# Patient Record
Sex: FEMALE | Race: WHITE | URBAN - METROPOLITAN AREA
[De-identification: names, ages, dates, MRNs, and addresses within clinical notes are randomized per-mention and may not be internally consistent; named-entity substitution may affect disease eponyms.]

---

## 2020-12-02 ENCOUNTER — OFFICE VISIT (OUTPATIENT)
Dept: URBAN - METROPOLITAN AREA CLINIC 35 | Facility: CLINIC | Age: 75
End: 2020-12-02

## 2021-01-27 ENCOUNTER — OFFICE VISIT (OUTPATIENT)
Dept: URBAN - METROPOLITAN AREA CLINIC 35 | Facility: CLINIC | Age: 76
End: 2021-01-27

## 2021-02-03 ENCOUNTER — OFFICE VISIT (OUTPATIENT)
Dept: URBAN - METROPOLITAN AREA CLINIC 31 | Facility: CLINIC | Age: 76
End: 2021-02-03

## 2021-02-03 VITALS — BODY MASS INDEX: 21.9 KG/M2 | WEIGHT: 119 LBS | HEIGHT: 62 IN

## 2021-02-03 RX ORDER — NIACIN 500 MG
1 TABLET ORAL DAILY
Status: ACTIVE | COMMUNITY

## 2021-02-03 RX ORDER — OMEPRAZOLE 40 MG/1
1 CAPSULE CAPSULE, DELAYED RELEASE ORAL QAM
Qty: 30 | Refills: 5 | OUTPATIENT
Start: 2021-02-03

## 2021-02-03 RX ORDER — OMEPRAZOLE 40 MG/1
1 CAPSULE CAPSULE, DELAYED RELEASE ORAL ONCE A DAY
Qty: 90 CAPSULE | Refills: 3 | Status: ACTIVE | COMMUNITY
Start: 2019-06-26

## 2021-02-03 RX ORDER — LORAZEPAM 0.5 MG/1
1 TABLET AS NEEDED TABLET ORAL AS NEEDED
Status: DISCONTINUED | COMMUNITY

## 2021-02-03 RX ORDER — OMEPRAZOLE AND SODIUM BICARBONATE 20; 1100 MG/1; MG/1
1 CAPSULE ON AN EMPTY STOMACH CAPSULE ORAL
Status: ACTIVE | COMMUNITY
Start: 2014-12-02

## 2021-02-03 RX ORDER — DOCUSATE SODIUM 100 MG/1
1 CAPSULE ORAL ONCE A DAY
Status: ACTIVE | COMMUNITY

## 2021-02-03 RX ORDER — UBIDECARENONE/VIT E ACET 100MG-5
1 TABLET CAPSULE ORAL ONCE A DAY
Status: ACTIVE | COMMUNITY

## 2021-02-03 RX ORDER — GLUCOSAMINE/CHONDR SU A SOD 750-600 MG
1 CAPSULE TABLET ORAL
Status: ACTIVE | COMMUNITY

## 2021-02-03 NOTE — HPI-MIGRATED HPI
;     Cough : Patient presents today for a follow-up office visit from 12/11/2019. She is currently taking Omeprazole 40 mg, daily. Patient uses both Zegerid and TUMS for breakthrough episodes of heartburn. She states that she continues to have coughing episodes, several times a day. Patient admits to breakthrough heartburn episodes and these are occurring about twice a month. She will either take a Zegrrid or TUMS for heartburn relief and they do help. Patient admits to "feeling like something is stuck in her throat". This globus type sensation comes and goes and patient states when it happens, it just takes time to go away. Patient denies any dysphagia episodes. She admits to recently having some regurgitation. Patient admits to feeling like being on the verge of feeling nauseous, but not having a full-blown episode of nausea. She denies vomiting. Of note, patient requests refills of Omeprazole 40 mg, daily, in a 90 day supply, sent to confirmed pharmacy in EMR.  ;

## 2021-02-17 ENCOUNTER — OFFICE VISIT (OUTPATIENT)
Dept: URBAN - METROPOLITAN AREA CLINIC 31 | Facility: CLINIC | Age: 76
End: 2021-02-17

## 2021-06-09 ENCOUNTER — OFFICE VISIT (OUTPATIENT)
Dept: URBAN - METROPOLITAN AREA CLINIC 31 | Facility: CLINIC | Age: 76
End: 2021-06-09

## 2021-06-09 VITALS
OXYGEN SATURATION: 95 % | WEIGHT: 120 LBS | SYSTOLIC BLOOD PRESSURE: 112 MMHG | DIASTOLIC BLOOD PRESSURE: 68 MMHG | HEART RATE: 77 BPM | HEIGHT: 62 IN | BODY MASS INDEX: 22.08 KG/M2

## 2021-06-09 RX ORDER — GLUCOSAMINE/CHONDR SU A SOD 750-600 MG
1 CAPSULE TABLET ORAL
Status: ACTIVE | COMMUNITY

## 2021-06-09 RX ORDER — DOCUSATE SODIUM 100 MG/1
1 CAPSULE ORAL ONCE A DAY
Status: ACTIVE | COMMUNITY

## 2021-06-09 RX ORDER — OMEPRAZOLE 40 MG/1
1 CAPSULE CAPSULE, DELAYED RELEASE ORAL QAM
Qty: 30 | Refills: 5 | Status: ACTIVE | COMMUNITY
Start: 2021-02-03

## 2021-06-09 RX ORDER — CARBOXYMETHYLCELLULOSE SODIUM 10 MG/ML
3 CAPSULES GEL OPHTHALMIC ONCE A DAY
Qty: 90 | Status: ACTIVE | COMMUNITY

## 2021-06-09 RX ORDER — NIACIN 500 MG
1 TABLET ORAL DAILY
Status: ACTIVE | COMMUNITY

## 2021-06-09 RX ORDER — UBIDECARENONE/VIT E ACET 100MG-5
1 TABLET CAPSULE ORAL ONCE A DAY
Status: ACTIVE | COMMUNITY

## 2021-06-09 RX ORDER — OMEPRAZOLE AND SODIUM BICARBONATE 20; 1100 MG/1; MG/1
1 CAPSULE ON AN EMPTY STOMACH CAPSULE ORAL
Status: ACTIVE | COMMUNITY
Start: 2014-12-02

## 2021-06-09 RX ORDER — OMEPRAZOLE 40 MG/1
1 CAPSULE CAPSULE, DELAYED RELEASE ORAL
Qty: 60 CAPSULES | Refills: 5
Start: 2021-02-03

## 2021-06-09 RX ORDER — OMEPRAZOLE 40 MG/1
1 CAPSULE CAPSULE, DELAYED RELEASE ORAL ONCE A DAY
Qty: 90 CAPSULE | Refills: 3 | Status: DISCONTINUED | COMMUNITY
Start: 2019-06-26

## 2021-06-09 NOTE — EXAM-MIGRATED EXAMINATIONS
GENERAL APPEARANCE: - alert, in no acute distress, well developed, well nourished;   EYES: - sclera anicteric bilaterally;   ORAL CAVITY: - mucosa moist;   THROAT: - clear;   NECK/THYROID: - neck supple, full range of motion, no cervical lymphadenopathy, no thyroid nodules, no thyromegaly, trachea midline;   HEART: - no murmurs, regular rate and rhythm, S1, S2 normal;   LUNGS: - clear to auscultation bilaterally, good air movement, no wheezes, rales, rhonchi;   ABDOMEN: - bowel sounds present, no masses palpable, no organomegaly , no rebound tenderness, soft, nontender, nondistended;   EXTREMITIES: - no clubbing, cyanosis, or edema;   NEUROLOGIC: - cranial nerves 2-12 grossly intact , alert and oriented , nonfocal;   PSYCH: - cooperative with exam, mood/affect full range;

## 2021-06-09 NOTE — HPI-MIGRATED HPI
;     Cough : Patient presents today for a follow-up office visit from 02/09/2021. She is currently taking Omeprazole 40 mg, daily. Patient uses both Zegerid and TUMS for breakthrough episodes of heartburn. She states the coughing episodes have worsened. She was having breakthrough episodes of heartburn at her last office visit. Patient states that the heartburn continues, with no "rhyme or reason". Some weeks she will heartburn every single day and some weeks she will go 3 days with no heartburn. The globus sensation is still present. Patient states that she has not had any regurgitation since her last office visit. The nausea she was experiencing at her last office visit has improved. Patient denies any vomiting. Patient continues to deny any dysphagia episodes. "Every now and then she thinks a pill may get stuck". Some things going wrong way with swallowing is noted.  Of note, patient states that over the last several weeks she has had some bright red rectal bleeding, intermittently. The blood is really just on the tissue, after wiping. She has not had enough blood to stain the toilet water. Patient believes she has hemorrhoids and believes they are bleeding. Patient would like to discuss getting a prescription sent in to help with the bleeding hemorrhoids.  She denies any hard stools.  She was using Colace up to 1 week ago.;

## 2021-09-01 ENCOUNTER — OFFICE VISIT (OUTPATIENT)
Dept: URBAN - METROPOLITAN AREA CLINIC 35 | Facility: CLINIC | Age: 76
End: 2021-09-01

## 2021-09-01 ENCOUNTER — LAB OUTSIDE AN ENCOUNTER (OUTPATIENT)
Dept: URBAN - METROPOLITAN AREA SURGERY CENTER 8 | Facility: SURGERY CENTER | Age: 76
End: 2021-09-01

## 2021-09-01 VITALS
DIASTOLIC BLOOD PRESSURE: 68 MMHG | HEIGHT: 62 IN | OXYGEN SATURATION: 98 % | WEIGHT: 120 LBS | HEART RATE: 84 BPM | SYSTOLIC BLOOD PRESSURE: 114 MMHG | BODY MASS INDEX: 22.08 KG/M2

## 2021-09-01 PROBLEM — 266435005 GASTRO-ESOPHAGEAL REFLUX DISEASE WITHOUT ESOPHAGITIS: Status: ACTIVE | Noted: 2021-06-09

## 2021-09-01 RX ORDER — NIACIN 500 MG
1 TABLET ORAL DAILY
Status: ACTIVE | COMMUNITY

## 2021-09-01 RX ORDER — CARBOXYMETHYLCELLULOSE SODIUM 10 MG/ML
3 CAPSULES GEL OPHTHALMIC ONCE A DAY
Qty: 90 | Status: ACTIVE | COMMUNITY

## 2021-09-01 RX ORDER — DOCUSATE SODIUM 100 MG/1
1 CAPSULE ORAL ONCE A DAY
Status: ACTIVE | COMMUNITY

## 2021-09-01 RX ORDER — SODIUM PICOSULFATE, MAGNESIUM OXIDE, AND ANHYDROUS CITRIC ACID 10; 3.5; 12 MG/160ML; G/160ML; G/160ML
160 ML LIQUID ORAL
Qty: 1 KIT | Refills: 0 | OUTPATIENT
Start: 2021-09-01

## 2021-09-01 RX ORDER — OMEPRAZOLE 40 MG/1
1 CAPSULE CAPSULE, DELAYED RELEASE ORAL
Qty: 60 CAPSULES | Refills: 5 | Status: ACTIVE | COMMUNITY
Start: 2021-02-03

## 2021-09-01 RX ORDER — OMEPRAZOLE AND SODIUM BICARBONATE 20; 1100 MG/1; MG/1
1 CAPSULE ON AN EMPTY STOMACH CAPSULE ORAL
Status: ACTIVE | COMMUNITY
Start: 2014-12-02

## 2021-09-01 RX ORDER — FAMOTIDINE 40 MG/1
1 TABLET AT BEDTIME TABLET, FILM COATED ORAL ONCE A DAY
Qty: 30 TABLET | Refills: 5 | OUTPATIENT
Start: 2021-09-01

## 2021-09-01 RX ORDER — UBIDECARENONE/VIT E ACET 100MG-5
1 TABLET CAPSULE ORAL ONCE A DAY
Status: ACTIVE | COMMUNITY

## 2021-09-01 NOTE — EXAM-MIGRATED EXAMINATIONS
GENERAL APPEARANCE: - alert, in no acute distress, well developed, well nourished;   EYES: - sclera anicteric bilaterally;   ORAL CAVITY: - mucosa moist;   THROAT: - clear;   NECK/THYROID: - neck supple, full range of motion, no cervical lymphadenopathy, no thyroid nodules, no thyromegaly, trachea midline;   SKIN: - no suspicious lesions, warm and dry, no spider angiomata, palmar erythema or icterus;   HEART: - no murmurs, regular rate and rhythm, S1, S2 normal;   LUNGS: - clear to auscultation bilaterally, good air movement, no wheezes, rales, rhonchi;   ABDOMEN: - bowel sounds present, no masses palpable, no organomegaly , no rebound tenderness, soft, nontender, nondistended;   EXTREMITIES: - no clubbing, cyanosis, or edema;

## 2021-09-01 NOTE — HPI-MIGRATED HPI
;     Cough : Patient presents today for a follow-up office visit from 06/09/2021. The barium swallow study was completed on 08/18/2021 and the GES was also completed on 06/18/2021. Both reports have been scanned into EMR. She is currently taking Omeprazole 40 mg, BID. Patient uses both Zegerid and TUMS for breakthrough episodes of heartburn. She states the coughing episodes have worsened. Patient states that she needs to do something to help with the coughing as it is much worse. Patient states that the heartburn has been "not good". Patient states the heartburn is sporadic and happens with no rhyme or reason. The globus sensation is still present. Patient states that she has not had any regurgitation since her last office visit. The nausea continues to improve. Patient denies any vomiting. Patient admits  dysphagia episodes with pills. This was not happening at her last office visit. Patient admits re-starting Colace, daily. Since her last office visit, the rectal bleeding has not stopped. In fact, whereas, last office visit, the blood was just on the tissue after wiping and she has now had one episode of rectal bleeding that stained the entire toilet bowel. She is currently having 1-2 bowel movements per day. Patient states that her stools are normal to loose. She denies mucous in her stool and states that melena is not present.  ;

## 2021-09-02 ENCOUNTER — WEB ENCOUNTER (OUTPATIENT)
Dept: URBAN - METROPOLITAN AREA CLINIC 35 | Facility: CLINIC | Age: 76
End: 2021-09-02

## 2021-10-04 ENCOUNTER — OFFICE VISIT (OUTPATIENT)
Dept: URBAN - METROPOLITAN AREA SURGERY CENTER 8 | Facility: SURGERY CENTER | Age: 76
End: 2021-10-04

## 2021-11-03 ENCOUNTER — OFFICE VISIT (OUTPATIENT)
Dept: URBAN - METROPOLITAN AREA CLINIC 35 | Facility: CLINIC | Age: 76
End: 2021-11-03

## 2021-11-18 ENCOUNTER — OFFICE VISIT (OUTPATIENT)
Dept: URBAN - METROPOLITAN AREA SURGERY CENTER 8 | Facility: SURGERY CENTER | Age: 76
End: 2021-11-18

## 2021-12-01 ENCOUNTER — OFFICE VISIT (OUTPATIENT)
Dept: URBAN - METROPOLITAN AREA CLINIC 35 | Facility: CLINIC | Age: 76
End: 2021-12-01

## 2021-12-01 VITALS
SYSTOLIC BLOOD PRESSURE: 116 MMHG | HEIGHT: 62 IN | WEIGHT: 116 LBS | OXYGEN SATURATION: 96 % | DIASTOLIC BLOOD PRESSURE: 70 MMHG | HEART RATE: 75 BPM | BODY MASS INDEX: 21.35 KG/M2

## 2021-12-01 PROBLEM — 49727002 COUGH: Status: ACTIVE | Noted: 2017-10-11

## 2021-12-01 RX ORDER — UBIDECARENONE/VIT E ACET 100MG-5
1 TABLET CAPSULE ORAL ONCE A DAY
Status: ACTIVE | COMMUNITY

## 2021-12-01 RX ORDER — OMEPRAZOLE 40 MG/1
1 CAPSULE CAPSULE, DELAYED RELEASE ORAL
Qty: 60 CAPSULES | Refills: 5 | Status: ACTIVE | COMMUNITY
Start: 2021-02-03

## 2021-12-01 RX ORDER — DOCUSATE SODIUM 100 MG/1
1 CAPSULE ORAL ONCE A DAY
Status: ACTIVE | COMMUNITY

## 2021-12-01 RX ORDER — OMEPRAZOLE AND SODIUM BICARBONATE 20; 1100 MG/1; MG/1
1 CAPSULE ON AN EMPTY STOMACH CAPSULE ORAL
Status: ACTIVE | COMMUNITY
Start: 2014-12-02

## 2021-12-01 RX ORDER — CARBOXYMETHYLCELLULOSE SODIUM 10 MG/ML
3 CAPSULES GEL OPHTHALMIC ONCE A DAY
Qty: 90 | Status: ACTIVE | COMMUNITY

## 2021-12-01 RX ORDER — SODIUM PICOSULFATE, MAGNESIUM OXIDE, AND ANHYDROUS CITRIC ACID 10; 3.5; 12 MG/160ML; G/160ML; G/160ML
160 ML LIQUID ORAL
Qty: 1 KIT | Refills: 0 | Status: DISCONTINUED | COMMUNITY
Start: 2021-09-01

## 2021-12-01 RX ORDER — NIACIN 500 MG
1 TABLET ORAL DAILY
Status: ACTIVE | COMMUNITY

## 2021-12-01 RX ORDER — FAMOTIDINE 40 MG/1
1 TABLET AT BEDTIME TABLET, FILM COATED ORAL ONCE A DAY
Qty: 30 TABLET | Refills: 5 | Status: ACTIVE | COMMUNITY
Start: 2021-09-01

## 2021-12-01 NOTE — HPI-MIGRATED HPI
;     Colonoscopy Follow-Up : Patient presents today for a follow-up after the colonoscopy that was done on 11/18/2021. Since the procedure patient states that she is having 1-2 bowel movements per day with normal and stools.  Patient denies seeing any blood or mucous in her stool. Patient denies melena. Patient states that she had no complications following her colonoscopy. Of note, patient is taking Omeprazole 40 mg, BID, Famotidine 40 mg, at bedtime, and Zegerid and TUMS as needed. She is still having heartburn episodes about 1-2 times a week. She also continues to have the coughing off and on.  She uses the Robitussin (containing only Dextromethorphan without Guafenesin) only when she is at work. If she coughs at work, this creates issues with other people being around and they get concerned. Patient denies any dysphagia episodes, nausea or vomiting. Patient has not had any rectal bleeding since her colonoscopy.  Reflux only intermitently present.;

## 2022-01-07 ENCOUNTER — TELEPHONE ENCOUNTER (OUTPATIENT)
Dept: URBAN - METROPOLITAN AREA CLINIC 36 | Facility: CLINIC | Age: 77
End: 2022-01-07

## 2022-01-07 RX ORDER — OMEPRAZOLE 40 MG/1
1 CAPSULE CAPSULE, DELAYED RELEASE ORAL
Qty: 180 | Refills: 2
Start: 2021-02-03

## 2022-01-18 ENCOUNTER — TELEPHONE ENCOUNTER (OUTPATIENT)
Dept: URBAN - METROPOLITAN AREA CLINIC 36 | Facility: CLINIC | Age: 77
End: 2022-01-18

## 2022-01-18 RX ORDER — OMEPRAZOLE 40 MG/1
1 CAPSULE CAPSULE, DELAYED RELEASE ORAL
Qty: 180 | Refills: 3
Start: 2021-02-03

## 2022-03-17 ENCOUNTER — TELEPHONE ENCOUNTER (OUTPATIENT)
Dept: URBAN - METROPOLITAN AREA CLINIC 35 | Facility: CLINIC | Age: 77
End: 2022-03-17

## 2022-04-15 ENCOUNTER — TELEPHONE ENCOUNTER (OUTPATIENT)
Dept: URBAN - METROPOLITAN AREA CLINIC 36 | Facility: CLINIC | Age: 77
End: 2022-04-15

## 2022-04-15 RX ORDER — FAMOTIDINE 40 MG/1
1 TABLET AT BEDTIME TABLET, FILM COATED ORAL ONCE A DAY
Qty: 90 | Refills: 2
Start: 2021-09-01

## 2022-04-27 ENCOUNTER — OFFICE VISIT (OUTPATIENT)
Dept: URBAN - METROPOLITAN AREA CLINIC 35 | Facility: CLINIC | Age: 77
End: 2022-04-27

## 2022-05-04 ENCOUNTER — OFFICE VISIT (OUTPATIENT)
Dept: URBAN - METROPOLITAN AREA CLINIC 35 | Facility: CLINIC | Age: 77
End: 2022-05-04
Payer: MEDICARE

## 2022-05-04 VITALS
BODY MASS INDEX: 22.08 KG/M2 | HEIGHT: 62 IN | DIASTOLIC BLOOD PRESSURE: 82 MMHG | SYSTOLIC BLOOD PRESSURE: 126 MMHG | WEIGHT: 120 LBS

## 2022-05-04 DIAGNOSIS — R68.81 EARLY SATIETY: ICD-10-CM

## 2022-05-04 DIAGNOSIS — K64.2 THIRD DEGREE HEMORRHOIDS: ICD-10-CM

## 2022-05-04 DIAGNOSIS — Z83.49 FAMILY HISTORY OF ALPHA-1-ANTITRYPSIN DEFICIENCY: ICD-10-CM

## 2022-05-04 DIAGNOSIS — R14.3 FLATULENCE: ICD-10-CM

## 2022-05-04 DIAGNOSIS — K62.5 RECTAL BLEEDING: ICD-10-CM

## 2022-05-04 DIAGNOSIS — K29.70 GASTRITIS, UNSPECIFIED, WITHOUT BLEEDING: ICD-10-CM

## 2022-05-04 DIAGNOSIS — K20.0 EOSINOPHILIC ESOPHAGITIS: ICD-10-CM

## 2022-05-04 DIAGNOSIS — R10.31 RIGHT LOWER QUADRANT PAIN: ICD-10-CM

## 2022-05-04 DIAGNOSIS — R05.9 COUGH: ICD-10-CM

## 2022-05-04 DIAGNOSIS — R05.8 DRY COUGH: ICD-10-CM

## 2022-05-04 DIAGNOSIS — Z86.010 PERSONAL HISTORY OF COLONIC POLYPS: ICD-10-CM

## 2022-05-04 DIAGNOSIS — R13.12 DYSPHAGIA, OROPHARYNGEAL: ICD-10-CM

## 2022-05-04 PROCEDURE — 99213 OFFICE O/P EST LOW 20 MIN: CPT | Performed by: INTERNAL MEDICINE

## 2022-05-04 RX ORDER — CARBOXYMETHYLCELLULOSE SODIUM 10 MG/ML
3 CAPSULES GEL OPHTHALMIC ONCE A DAY
Qty: 90 | Status: ACTIVE | COMMUNITY

## 2022-05-04 RX ORDER — OMEPRAZOLE AND SODIUM BICARBONATE 20; 1100 MG/1; MG/1
1 CAPSULE ON AN EMPTY STOMACH CAPSULE ORAL
Status: ACTIVE | COMMUNITY
Start: 2014-12-02

## 2022-05-04 RX ORDER — NIACIN 500 MG
1 TABLET ORAL DAILY
Status: ACTIVE | COMMUNITY

## 2022-05-04 RX ORDER — OMEPRAZOLE 40 MG/1
1 CAPSULE CAPSULE, DELAYED RELEASE ORAL
Qty: 180 | Refills: 3 | Status: ACTIVE | COMMUNITY
Start: 2021-02-03

## 2022-05-04 RX ORDER — UBIDECARENONE/VIT E ACET 100MG-5
1 TABLET CAPSULE ORAL ONCE A DAY
Status: ACTIVE | COMMUNITY

## 2022-05-04 RX ORDER — DOCUSATE SODIUM 100 MG/1
1 CAPSULE ORAL ONCE A DAY
Status: ACTIVE | COMMUNITY

## 2022-05-04 RX ORDER — FAMOTIDINE 40 MG/1
1 TABLET AT BEDTIME TABLET, FILM COATED ORAL ONCE A DAY
Qty: 90 | Refills: 2 | Status: ACTIVE | COMMUNITY
Start: 2021-09-01

## 2022-05-05 ENCOUNTER — OFFICE VISIT (OUTPATIENT)
Dept: URBAN - METROPOLITAN AREA CLINIC 35 | Facility: CLINIC | Age: 77
End: 2022-05-05

## 2022-06-06 LAB
AAT, DNA ANALYSIS: (no result)
ADDITIONAL INFORMATION:: (no result)
Lab: (no result)

## 2022-09-07 ENCOUNTER — OFFICE VISIT (OUTPATIENT)
Dept: URBAN - METROPOLITAN AREA CLINIC 35 | Facility: CLINIC | Age: 77
End: 2022-09-07
Payer: MEDICARE

## 2022-09-07 VITALS — HEIGHT: 62 IN | WEIGHT: 122 LBS | BODY MASS INDEX: 22.45 KG/M2

## 2022-09-07 DIAGNOSIS — Z83.49 FAMILY HISTORY OF ALPHA-1-ANTITRYPSIN DEFICIENCY: ICD-10-CM

## 2022-09-07 DIAGNOSIS — R10.31 RIGHT LOWER QUADRANT PAIN: ICD-10-CM

## 2022-09-07 DIAGNOSIS — K20.0 EOSINOPHILIC ESOPHAGITIS: ICD-10-CM

## 2022-09-07 DIAGNOSIS — Z86.010 PERSONAL HISTORY OF COLONIC POLYPS: ICD-10-CM

## 2022-09-07 DIAGNOSIS — R13.12 DYSPHAGIA, OROPHARYNGEAL: ICD-10-CM

## 2022-09-07 DIAGNOSIS — R68.81 EARLY SATIETY: ICD-10-CM

## 2022-09-07 DIAGNOSIS — R05.9 COUGH: ICD-10-CM

## 2022-09-07 DIAGNOSIS — K62.5 RECTAL BLEEDING: ICD-10-CM

## 2022-09-07 DIAGNOSIS — K29.70 GASTRITIS, UNSPECIFIED, WITHOUT BLEEDING: ICD-10-CM

## 2022-09-07 DIAGNOSIS — R14.3 FLATULENCE: ICD-10-CM

## 2022-09-07 DIAGNOSIS — K64.2 THIRD DEGREE HEMORRHOIDS: ICD-10-CM

## 2022-09-07 PROCEDURE — 99213 OFFICE O/P EST LOW 20 MIN: CPT | Performed by: INTERNAL MEDICINE

## 2022-09-07 RX ORDER — GABAPENTIN 300 MG/1
1 CAPSULE CAPSULE ORAL ONCE A DAY
Status: ACTIVE | COMMUNITY

## 2022-09-07 RX ORDER — CARBOXYMETHYLCELLULOSE SODIUM 10 MG/ML
3 CAPSULES GEL OPHTHALMIC ONCE A DAY
Qty: 90 | Status: ACTIVE | COMMUNITY

## 2022-09-07 RX ORDER — NIACIN 500 MG
1 TABLET ORAL DAILY
Status: ACTIVE | COMMUNITY

## 2022-09-07 RX ORDER — DOCUSATE SODIUM 100 MG/1
1 CAPSULE ORAL ONCE A DAY
Status: ACTIVE | COMMUNITY

## 2022-09-07 RX ORDER — UBIDECARENONE/VIT E ACET 100MG-5
1 TABLET CAPSULE ORAL ONCE A DAY
Status: ACTIVE | COMMUNITY

## 2022-09-07 RX ORDER — OMEPRAZOLE 40 MG/1
1 CAPSULE CAPSULE, DELAYED RELEASE ORAL ONCE A DAY
Qty: 90 CAPSULE | Refills: 3 | Status: ACTIVE | COMMUNITY
Start: 2021-02-03

## 2022-09-07 RX ORDER — TRAMADOL HYDROCHLORIDE 50 MG/1
1 TABLET AS NEEDED TABLET, FILM COATED ORAL ONCE A DAY
Status: ACTIVE | COMMUNITY

## 2022-09-07 RX ORDER — OMEPRAZOLE AND SODIUM BICARBONATE 20; 1100 MG/1; MG/1
1 CAPSULE ON AN EMPTY STOMACH CAPSULE ORAL
Status: ACTIVE | COMMUNITY
Start: 2014-12-02

## 2022-09-07 RX ORDER — FAMOTIDINE 40 MG/1
1 TABLET AT BEDTIME TABLET, FILM COATED ORAL ONCE A DAY
Qty: 90 | Refills: 2 | Status: ACTIVE | COMMUNITY
Start: 2021-09-01

## 2022-09-07 NOTE — HPI-COUGH
Patient presents today for a follow-up office visit from 05/24/2022. She is taking Omeprazole 40 mg daily (she confirms she is just taking this once a day) and Famotidine 40 mg, at bedtime. Patient uses Zegerid and TUMS as needed. She admits improvement in the heartburn. Patient states that coughing episodes are much better after stopping the Niacin and Omega 3. Patient denies any dysphagia episodes, nausea or vomiting. Patient is taking Colace, 2 tablets daily. Patient states that the rectal bleeding is about the same and still "comes and goes". She denies constipation and hard stools.

## 2022-09-08 NOTE — HPI-COUGH
Airway       Patient location during procedure: OR  Staff -        Anesthesiologist:  Agapito Lino MD       CRNA: Yuko Galloway APRN CRNA       Other Anesthesia Staff: Tremayne Baker       Performed By: SRNA  Consent for Airway        Urgency: elective  Indications and Patient Condition       Indications for airway management: cristino-procedural       Induction type:intravenous       Mask difficulty assessment: 1 - vent by mask    Final Airway Details       Final airway type: supraglottic airway    Supraglottic Airway Details        Type: LMA       Brand: I-Gel       LMA size: 4    Post intubation assessment        Placement verified by: capnometry, equal breath sounds and chest rise        Number of attempts at approach: 1       Number of other approaches attempted: 0       Secured with: pink tape       Ease of procedure: easy       Dentition: Intact and Unchanged       Patient presents today for a follow-up office visit from 12/01/2021. She is taking Omeprazole 40 mg BID, Famotidine 40 mg, at bedtime. Patient uses Zegerid and TUMS as needed. She admits improvement in the heartburn. Patient states that coughing episodes are even worse. She is taking more of the Robitussin and is also taking Tussin DM at night. These medications wear off more quickly now. Patient has seen both the ENT and pulmonologist and they did not have much to offer to explain the chronic cough. The pulmonologist suggested some inhalers and the ENT suggested possible future allergy testing. Over the last few days, the cough has increased even more. Patient denies any dysphagia episodes, nausea or vomiting. Of note, patient admits to some intermittent rectal bleeding. The blood is bright red and sometimes enough to stain the toilet water. Rectal bleeding happens about once every couple of weeks.

## 2023-02-08 ENCOUNTER — TELEPHONE ENCOUNTER (OUTPATIENT)
Dept: URBAN - METROPOLITAN AREA CLINIC 31 | Facility: CLINIC | Age: 78
End: 2023-02-08

## 2023-02-08 RX ORDER — FAMOTIDINE 40 MG/1
1 TABLET AT BEDTIME TABLET, FILM COATED ORAL ONCE A DAY
Qty: 90 | Refills: 2
Start: 2021-09-01

## 2023-03-08 ENCOUNTER — OFFICE VISIT (OUTPATIENT)
Dept: URBAN - METROPOLITAN AREA CLINIC 31 | Facility: CLINIC | Age: 78
End: 2023-03-08
Payer: MEDICARE

## 2023-03-08 VITALS
BODY MASS INDEX: 22.45 KG/M2 | SYSTOLIC BLOOD PRESSURE: 140 MMHG | HEIGHT: 62 IN | OXYGEN SATURATION: 100 % | HEART RATE: 73 BPM | WEIGHT: 122 LBS | DIASTOLIC BLOOD PRESSURE: 80 MMHG

## 2023-03-08 DIAGNOSIS — R13.12 DYSPHAGIA, OROPHARYNGEAL: ICD-10-CM

## 2023-03-08 DIAGNOSIS — K62.5 RECTAL BLEEDING: ICD-10-CM

## 2023-03-08 DIAGNOSIS — K29.70 GASTRITIS, UNSPECIFIED, WITHOUT BLEEDING: ICD-10-CM

## 2023-03-08 DIAGNOSIS — K64.2 THIRD DEGREE HEMORRHOIDS: ICD-10-CM

## 2023-03-08 DIAGNOSIS — K20.0 EOSINOPHILIC ESOPHAGITIS: ICD-10-CM

## 2023-03-08 DIAGNOSIS — R12 HEARTBURN: ICD-10-CM

## 2023-03-08 DIAGNOSIS — R68.81 EARLY SATIETY: ICD-10-CM

## 2023-03-08 DIAGNOSIS — Z86.010 PERSONAL HISTORY OF COLONIC POLYPS: ICD-10-CM

## 2023-03-08 DIAGNOSIS — R14.3 FLATULENCE: ICD-10-CM

## 2023-03-08 DIAGNOSIS — R10.31 RIGHT LOWER QUADRANT PAIN: ICD-10-CM

## 2023-03-08 DIAGNOSIS — R05.9 COUGH: ICD-10-CM

## 2023-03-08 DIAGNOSIS — Z83.49 FAMILY HISTORY OF ALPHA-1-ANTITRYPSIN DEFICIENCY: ICD-10-CM

## 2023-03-08 PROBLEM — 428283002 HISTORY OF POLYP OF COLON (SITUATION): Status: ACTIVE | Noted: 2021-06-09

## 2023-03-08 PROBLEM — 196731005 GASTRODUODENITIS: Status: ACTIVE | Noted: 2018-01-11

## 2023-03-08 PROBLEM — 71457002 OROPHARYNGEAL DYSPHAGIA: Status: ACTIVE | Noted: 2021-06-09

## 2023-03-08 PROBLEM — 235599003 EOSINOPHILIC ESOPHAGITIS: Status: ACTIVE | Noted: 2018-01-11

## 2023-03-08 PROCEDURE — 99213 OFFICE O/P EST LOW 20 MIN: CPT | Performed by: INTERNAL MEDICINE

## 2023-03-08 RX ORDER — OMEPRAZOLE 40 MG/1
1 CAPSULE CAPSULE, DELAYED RELEASE ORAL ONCE A DAY
Qty: 90 CAPSULE | Refills: 3 | Status: ACTIVE | COMMUNITY
Start: 2021-02-03

## 2023-03-08 RX ORDER — TRAMADOL HYDROCHLORIDE 50 MG/1
1 TABLET AS NEEDED TABLET, FILM COATED ORAL ONCE A DAY
Status: ON HOLD | COMMUNITY

## 2023-03-08 RX ORDER — NIACIN 500 MG
1 TABLET ORAL DAILY
Status: ACTIVE | COMMUNITY

## 2023-03-08 RX ORDER — GABAPENTIN 300 MG/1
1 CAPSULE CAPSULE ORAL ONCE A DAY
Status: ON HOLD | COMMUNITY

## 2023-03-08 RX ORDER — FAMOTIDINE 20 MG/1
1 TABLET AT BEDTIME TABLET, FILM COATED ORAL ONCE A DAY
Qty: 90 | Refills: 2 | Status: ACTIVE | COMMUNITY
Start: 2021-09-01

## 2023-03-08 RX ORDER — ATORVASTATIN CALCIUM 20 MG/1
1 TABLET TABLET, FILM COATED ORAL ONCE A DAY
Refills: 0 | Status: ACTIVE | COMMUNITY

## 2023-03-08 RX ORDER — ASPIRIN 81 MG/1
1 TABLET TABLET, COATED ORAL ONCE A DAY
Status: ACTIVE | COMMUNITY

## 2023-03-08 RX ORDER — MULTIVIT-MIN/IRON/FOLIC ACID/K 18-600-40
1 CAPSULE CAPSULE ORAL ONCE A DAY
Status: ACTIVE | COMMUNITY

## 2023-03-08 RX ORDER — ZOLPIDEM TARTRATE 10 MG/1
0.5 TABLET AT BEDTIME TABLET, FILM COATED ORAL ONCE A DAY
Refills: 0 | Status: ACTIVE | COMMUNITY

## 2023-03-08 RX ORDER — CARBOXYMETHYLCELLULOSE SODIUM 10 MG/ML
1 CAPSULES GEL OPHTHALMIC ONCE A DAY
Qty: 30 CAPSULE | Status: ACTIVE | COMMUNITY

## 2023-03-08 RX ORDER — OMEPRAZOLE 40 MG/1
1 CAPSULE CAPSULE, DELAYED RELEASE ORAL ONCE A DAY
Qty: 90 CAPSULE | Refills: 3
Start: 2021-02-03

## 2023-03-08 RX ORDER — DOCUSATE SODIUM 100 MG/1
2 CAPSULES CAPSULE ORAL ONCE A DAY
Status: ACTIVE | COMMUNITY

## 2023-03-08 RX ORDER — MECLIZINE HYDROCHLORIDE 25 MG/1
1 TABLET AS NEEDED TABLET ORAL
Status: ACTIVE | COMMUNITY

## 2023-03-08 RX ORDER — OMEPRAZOLE AND SODIUM BICARBONATE 20; 1100 MG/1; MG/1
1 CAPSULE ON AN EMPTY STOMACH CAPSULE ORAL
Status: ACTIVE | COMMUNITY
Start: 2014-12-02

## 2023-03-08 RX ORDER — UBIDECARENONE/VIT E ACET 100MG-5
1 TABLET CAPSULE ORAL ONCE A DAY
Status: ACTIVE | COMMUNITY

## 2023-03-08 RX ORDER — FAMOTIDINE 20 MG/1
1 TABLET AT BEDTIME TABLET, FILM COATED ORAL ONCE A DAY
Qty: 90 | Refills: 3
Start: 2021-09-01

## 2023-03-08 NOTE — HPI-COUGH
Patient presents today for a follow-up office visit from 09/07/2022. She is taking Omeprazole 40 mg daily and Famotidine 40 mg, at bedtime. Patient denies the continued use of Zegerid since 4-5 month ago.  She admits the continued use of TUMS as needed. She admits improvement in the heartburn. She admits to continued episodes of coughing intermittently.  Cough went away and then returned after a cold.  Patient denies any dysphagia episodes, nausea or vomiting. Patient is taking Colace, 2 tablets daily. She admits intermittent rectal bleeding at bowel movement within the toilet water. No anemia noted on any blood work per patient.  Patient denies any episodes of constipation or diarrhea at this time. Patient denies needing any refills at this time.

## 2024-03-06 ENCOUNTER — OV EP (OUTPATIENT)
Dept: URBAN - METROPOLITAN AREA CLINIC 31 | Facility: CLINIC | Age: 79
End: 2024-03-06

## 2024-03-12 ENCOUNTER — OV EP (OUTPATIENT)
Dept: URBAN - METROPOLITAN AREA CLINIC 31 | Facility: CLINIC | Age: 79
End: 2024-03-12
Payer: MEDICARE

## 2024-03-12 VITALS
HEIGHT: 62 IN | OXYGEN SATURATION: 94 % | HEART RATE: 81 BPM | SYSTOLIC BLOOD PRESSURE: 160 MMHG | BODY MASS INDEX: 22.82 KG/M2 | WEIGHT: 124 LBS | DIASTOLIC BLOOD PRESSURE: 90 MMHG

## 2024-03-12 DIAGNOSIS — K21.9 GASTROESOPHAGEAL REFLUX DISEASE WITHOUT ESOPHAGITIS: ICD-10-CM

## 2024-03-12 DIAGNOSIS — K64.2 BLEEDING GRADE III HEMORRHOIDS: ICD-10-CM

## 2024-03-12 PROBLEM — 428283002: Status: ACTIVE | Noted: 2024-03-12

## 2024-03-12 PROBLEM — 266435005: Status: ACTIVE | Noted: 2024-03-12

## 2024-03-12 PROCEDURE — 99214 OFFICE O/P EST MOD 30 MIN: CPT | Performed by: STUDENT IN AN ORGANIZED HEALTH CARE EDUCATION/TRAINING PROGRAM

## 2024-03-12 RX ORDER — FAMOTIDINE 40 MG/1
1 TABLET TABLET, FILM COATED ORAL
Qty: 180 | Refills: 3 | OUTPATIENT
Start: 2024-03-12

## 2024-03-12 RX ORDER — DOCUSATE SODIUM 100 MG/1
2 CAPSULES CAPSULE ORAL ONCE A DAY
Status: ACTIVE | COMMUNITY

## 2024-03-12 RX ORDER — OMEPRAZOLE 40 MG/1
1 CAPSULE CAPSULE, DELAYED RELEASE ORAL ONCE A DAY
Qty: 90 CAPSULE | Refills: 3 | Status: ACTIVE | COMMUNITY
Start: 2021-02-03

## 2024-03-12 RX ORDER — ATORVASTATIN CALCIUM 20 MG/1
1 TABLET TABLET, FILM COATED ORAL ONCE A DAY
Refills: 0 | Status: ACTIVE | COMMUNITY

## 2024-03-12 RX ORDER — MECLIZINE HYDROCHLORIDE 25 MG/1
1 TABLET AS NEEDED TABLET ORAL
Status: ACTIVE | COMMUNITY

## 2024-03-12 RX ORDER — UBIDECARENONE/VIT E ACET 100MG-5
1 TABLET CAPSULE ORAL ONCE A DAY
Status: ON HOLD | COMMUNITY

## 2024-03-12 RX ORDER — TRAMADOL HYDROCHLORIDE 50 MG/1
1 TABLET AS NEEDED TABLET, FILM COATED ORAL ONCE A DAY
Status: ON HOLD | COMMUNITY

## 2024-03-12 RX ORDER — CARBOXYMETHYLCELLULOSE SODIUM 10 MG/ML
1 CAPSULES GEL OPHTHALMIC ONCE A DAY
Qty: 30 CAPSULE | Status: ACTIVE | COMMUNITY

## 2024-03-12 RX ORDER — FAMOTIDINE 20 MG/1
1 TABLET AT BEDTIME TABLET, FILM COATED ORAL ONCE A DAY
Qty: 90 | Refills: 3 | Status: ACTIVE | COMMUNITY
Start: 2021-09-01

## 2024-03-12 RX ORDER — OMEPRAZOLE AND SODIUM BICARBONATE 20; 1100 MG/1; MG/1
1 CAPSULE ON AN EMPTY STOMACH CAPSULE ORAL
Status: ACTIVE | COMMUNITY
Start: 2014-12-02

## 2024-03-12 RX ORDER — ASPIRIN 81 MG/1
1 TABLET TABLET, COATED ORAL ONCE A DAY
Status: ACTIVE | COMMUNITY

## 2024-03-12 RX ORDER — CALCIUM CARBONATE 500 MG/1
1 TABLET TABLET ORAL THREE TIMES A DAY
Status: ACTIVE | COMMUNITY

## 2024-03-12 RX ORDER — NIACIN 500 MG
1 TABLET ORAL DAILY
Status: ON HOLD | COMMUNITY

## 2024-03-12 RX ORDER — HYDROCORTISONE ACETATE 25 MG/1
1 SUPPOSITORY SUPPOSITORY RECTAL ONCE A DAY
Qty: 10 | Refills: 0 | OUTPATIENT
Start: 2024-03-12 | End: 2024-03-22

## 2024-03-12 RX ORDER — GABAPENTIN 300 MG/1
1 CAPSULE CAPSULE ORAL ONCE A DAY
Status: ON HOLD | COMMUNITY

## 2024-03-12 RX ORDER — MULTIVIT-MIN/IRON/FOLIC ACID/K 18-600-40
1 CAPSULE CAPSULE ORAL ONCE A DAY
Status: ACTIVE | COMMUNITY

## 2024-03-12 RX ORDER — ZOLPIDEM TARTRATE 10 MG/1
0.5 TABLET AT BEDTIME TABLET, FILM COATED ORAL ONCE A DAY
Refills: 0 | Status: ACTIVE | COMMUNITY

## 2024-03-12 NOTE — HPI-TODAY'S VISIT:
79 year old female patient presents today for a routine follow-up visit. Previously following with Dr. Good for years. Patient following up primarily today to discuss reflux history and coming off PPI. She has reflux, can go for some time without symptoms and then can recurs. Comes in periods of time. She is hestiant to stay on PPI long term, she reports recent osteopenia and hesitant w/ potential  fracture association. She is also concerned of dementia association. She is taking famotidine nightly. Currently, helps relatively well. And wants to see if can take twice day.  Intermittent choking/difficulty breathing sensation otherwise no food sticking after swallowing, symptoms chronic,  multiple prior work up including MBS w/o significant findings or aspiration.  She reports hemorrhoidal symptoms, with intermittent rectal bleeding comes and goes. Does not want hemorrhoid surgery. Reports relatively excess gas, takes beano, gasx. Has list high gas producing foods andavoid. She does endorse eats lots sweets. She has BM at least once in morning, sometimes twice. Denies subjective constipation.    Last Colonoscopy 11/18/2021 One 4 mm polyp in the descending colon, removed with a cold snare. Resected and retrieved.Internal prolapsed hemorrhoids.  Pathology report of 11/18/2021 1. Colon, Descending, Polyp - Sessile serrated polyp/adenoma  Last EGD 12/01/2017 W/Bravo - Normal esophagus - Z-line regular, 39 cm from the incisors - Non-bleeding erosive gastropathy - Normal examined duodenum - Multiple biopsies were obtained in the distal esophagus - Multiple biopsies were obtained in the gastric antrum - The Bravo capsule was deployed  Pathology report of 12/01/2017 A. Stomach, Antrum Biopsy - Antral type gastric mucosa with regenerative epithelial changes suggestive of chemical injury/healing ulcer site - Giemsa stain is negative for H. Pylori microorganisms - PAS/Alcian blue stain is negative for intestinal metaplasia B. Distal Esophagus Biopsy - Eosinophilic esophagitis - No dysplasia identified - PAS/Alcian blue stain is negative for intestinal metaplasia - PAS portion of PAS/Alcian blue stain is negative for fungal prganisms  Last visit 03/08/2023 Patient presents today for a follow-up office visit from 09/07/2022. She is taking Omeprazole 40 mg daily and Famotidine 40 mg, at bedtime. Patient denies the continued use of Zegerid since 4-5 month ago. She admits the continued use of TUMS as needed. She admits improvement in the heartburn. She admits to continued episodes of coughing intermittently. Cough went away and then returned after a cold. Patient denies any dysphagia episodes, nausea or vomiting. Patient is taking Colace, 2 tablets daily. She admits intermittent rectal bleeding at bowel movement within the toilet water. No anemia noted on any blood work per patient. Patient denies any episodes of constipation or diarrhea at this time. Patient denies needing any refills at this time.

## 2024-06-11 ENCOUNTER — TELEPHONE ENCOUNTER (OUTPATIENT)
Dept: URBAN - METROPOLITAN AREA CLINIC 35 | Facility: CLINIC | Age: 79
End: 2024-06-11

## 2024-06-11 RX ORDER — HYDROCORTISONE ACETATE 25 MG/1
1 SUPPOSITORY SUPPOSITORY RECTAL ONCE A DAY
Qty: 14 | Refills: 0
Start: 2024-03-12 | End: 2024-06-25

## 2024-07-24 ENCOUNTER — LAB OUTSIDE AN ENCOUNTER (OUTPATIENT)
Dept: URBAN - METROPOLITAN AREA CLINIC 35 | Facility: CLINIC | Age: 79
End: 2024-07-24

## 2024-07-24 ENCOUNTER — OFFICE VISIT (OUTPATIENT)
Dept: URBAN - METROPOLITAN AREA CLINIC 35 | Facility: CLINIC | Age: 79
End: 2024-07-24
Payer: MEDICARE

## 2024-07-24 ENCOUNTER — DASHBOARD ENCOUNTERS (OUTPATIENT)
Age: 79
End: 2024-07-24

## 2024-07-24 VITALS
BODY MASS INDEX: 22.63 KG/M2 | DIASTOLIC BLOOD PRESSURE: 88 MMHG | HEIGHT: 62 IN | OXYGEN SATURATION: 96 % | SYSTOLIC BLOOD PRESSURE: 154 MMHG | HEART RATE: 92 BPM | WEIGHT: 123 LBS

## 2024-07-24 DIAGNOSIS — K21.9 GASTROESOPHAGEAL REFLUX DISEASE WITHOUT ESOPHAGITIS: ICD-10-CM

## 2024-07-24 DIAGNOSIS — K62.5 RECTAL BLEEDING: ICD-10-CM

## 2024-07-24 DIAGNOSIS — Z86.010 HISTORY OF COLON POLYPS: ICD-10-CM

## 2024-07-24 DIAGNOSIS — K64.8 INTERNAL HEMORRHOIDS: ICD-10-CM

## 2024-07-24 PROCEDURE — 99214 OFFICE O/P EST MOD 30 MIN: CPT | Performed by: STUDENT IN AN ORGANIZED HEALTH CARE EDUCATION/TRAINING PROGRAM

## 2024-07-24 RX ORDER — MULTIVIT-MIN/IRON/FOLIC ACID/K 18-600-40
1 CAPSULE CAPSULE ORAL ONCE A DAY
Status: ACTIVE | COMMUNITY

## 2024-07-24 RX ORDER — CALCIUM CARBONATE 500 MG/1
1 TABLET TABLET ORAL THREE TIMES A DAY
Status: ACTIVE | COMMUNITY

## 2024-07-24 RX ORDER — TRAMADOL HYDROCHLORIDE 50 MG/1
1 TABLET AS NEEDED TABLET, FILM COATED ORAL ONCE A DAY
Status: ON HOLD | COMMUNITY

## 2024-07-24 RX ORDER — MECLIZINE HYDROCHLORIDE 25 MG/1
1 TABLET AS NEEDED TABLET ORAL
Status: ACTIVE | COMMUNITY

## 2024-07-24 RX ORDER — CARBOXYMETHYLCELLULOSE SODIUM 10 MG/ML
1 CAPSULES GEL OPHTHALMIC ONCE A DAY
Qty: 30 CAPSULE | Status: ACTIVE | COMMUNITY

## 2024-07-24 RX ORDER — UBIDECARENONE/VIT E ACET 100MG-5
1 TABLET CAPSULE ORAL ONCE A DAY
Status: ON HOLD | COMMUNITY

## 2024-07-24 RX ORDER — GABAPENTIN 300 MG/1
1 CAPSULE CAPSULE ORAL ONCE A DAY
Status: ON HOLD | COMMUNITY

## 2024-07-24 RX ORDER — FAMOTIDINE 20 MG/1
1 TABLET AT BEDTIME TABLET, FILM COATED ORAL ONCE A DAY
Qty: 90 | Refills: 3 | Status: ACTIVE | COMMUNITY
Start: 2021-09-01

## 2024-07-24 RX ORDER — ZOLPIDEM TARTRATE 10 MG/1
0.5 TABLET AT BEDTIME TABLET, FILM COATED ORAL ONCE A DAY
Refills: 0 | Status: ACTIVE | COMMUNITY

## 2024-07-24 RX ORDER — NIACIN 500 MG
1 TABLET ORAL DAILY
Status: ON HOLD | COMMUNITY

## 2024-07-24 RX ORDER — ASPIRIN 81 MG/1
1 TABLET TABLET, COATED ORAL ONCE A DAY
Status: ACTIVE | COMMUNITY

## 2024-07-24 RX ORDER — OMEPRAZOLE AND SODIUM BICARBONATE 20; 1100 MG/1; MG/1
1 CAPSULE ON AN EMPTY STOMACH CAPSULE ORAL
Status: ACTIVE | COMMUNITY
Start: 2014-12-02

## 2024-07-24 RX ORDER — DOCUSATE SODIUM 100 MG/1
2 CAPSULES CAPSULE ORAL ONCE A DAY
Status: ACTIVE | COMMUNITY

## 2024-07-24 RX ORDER — FAMOTIDINE 40 MG/1
1 TABLET TABLET, FILM COATED ORAL
Qty: 180 | Refills: 3 | Status: ACTIVE | COMMUNITY
Start: 2024-03-12

## 2024-07-24 RX ORDER — ATORVASTATIN CALCIUM 20 MG/1
1 TABLET TABLET, FILM COATED ORAL ONCE A DAY
Refills: 0 | Status: ACTIVE | COMMUNITY

## 2024-07-24 RX ORDER — OMEPRAZOLE 40 MG/1
1 CAPSULE CAPSULE, DELAYED RELEASE ORAL ONCE A DAY
Qty: 90 CAPSULE | Refills: 3 | Status: ON HOLD | COMMUNITY
Start: 2021-02-03

## 2024-07-24 NOTE — HPI-TODAY'S VISIT:
79 year old female presents today for GERD and hemrrohoids follow up. Doing well on famotidine, no significant breakthrough heartburn or coughing/choking sensation. For hemorrhoids,has tried suppositories PRN, partial relief but not complete prolonged relief symptoms. Intermittent rectal bleeding still. Tried metamucil felt made her bloat. Generally 1 BM per day, intermittent incomplete evacuation.    Of last visit 3.12.24 79 year old female patient presents today for a routine follow-up visit. Previously following with Dr. Good for years. Patient following up primarily today to discuss reflux history and coming off PPI. She has reflux, can go for some time without symptoms and then can recurs. Comes in periods of time. She is hestiant to stay on PPI long term, she reports recent osteopenia and hesitant w/ potential  fracture association. She is also concerned of dementia association. She is taking famotidine nightly. Currently, helps relatively well. And wants to see if can take twice day.  Intermittent choking/difficulty breathing sensation otherwise no food sticking after swallowing, symptoms chronic,  multiple prior work up including MBS w/o significant findings or aspiration.  She reports hemorrhoidal symptoms, with intermittent rectal bleeding comes and goes. Does not want hemorrhoid surgery. Reports relatively excess gas, takes beano, gasx. Has list high gas producing foods andavoid. She does endorse eats lots sweets. She has BM at least once in morning, sometimes twice. Denies subjective constipation.    Last Colonoscopy 11/18/2021 One 4 mm polyp in the descending colon, removed with a cold snare. Resected and retrieved.Internal prolapsed hemorrhoids.  Pathology report of 11/18/2021 1. Colon, Descending, Polyp - Sessile serrated polyp/adenoma  Last EGD 12/01/2017 W/Bravo - Normal esophagus - Z-line regular, 39 cm from the incisors - Non-bleeding erosive gastropathy - Normal examined duodenum - Multiple biopsies were obtained in the distal esophagus - Multiple biopsies were obtained in the gastric antrum - The Bravo capsule was deployed  Pathology report of 12/01/2017 A. Stomach, Antrum Biopsy - Antral type gastric mucosa with regenerative epithelial changes suggestive of chemical injury/healing ulcer site - Giemsa stain is negative for H. Pylori microorganisms - PAS/Alcian blue stain is negative for intestinal metaplasia B. Distal Esophagus Biopsy - Eosinophilic esophagitis - No dysplasia identified - PAS/Alcian blue stain is negative for intestinal metaplasia - PAS portion of PAS/Alcian blue stain is negative for fungal prganisms  Last visit 03/08/2023 Patient presents today for a follow-up office visit from 09/07/2022. She is taking Omeprazole 40 mg daily and Famotidine 40 mg, at bedtime. Patient denies the continued use of Zegerid since 4-5 month ago. She admits the continued use of TUMS as needed. She admits improvement in the heartburn. She admits to continued episodes of coughing intermittently. Cough went away and then returned after a cold. Patient denies any dysphagia episodes, nausea or vomiting. Patient is taking Colace, 2 tablets daily. She admits intermittent rectal bleeding at bowel movement within the toilet water. No anemia noted on any blood work per patient. Patient denies any episodes of constipation or diarrhea at this time. Patient denies needing any refills at this time.

## 2024-08-19 ENCOUNTER — OFFICE VISIT (OUTPATIENT)
Dept: URBAN - METROPOLITAN AREA SURGERY CENTER 8 | Facility: SURGERY CENTER | Age: 79
End: 2024-08-19

## 2024-09-11 ENCOUNTER — OFFICE VISIT (OUTPATIENT)
Dept: URBAN - METROPOLITAN AREA CLINIC 35 | Facility: CLINIC | Age: 79
End: 2024-09-11

## 2024-10-07 ENCOUNTER — WEB ENCOUNTER (OUTPATIENT)
Dept: URBAN - METROPOLITAN AREA CLINIC 35 | Facility: CLINIC | Age: 79
End: 2024-10-07

## 2024-10-07 RX ORDER — HYDROCORTISONE ACETATE 25 MG/1
1 SUPPOSITORY SUPPOSITORY RECTAL ONCE A DAY
Qty: 14 | Refills: 0
Start: 2024-03-12 | End: 2024-10-22

## 2024-10-12 ENCOUNTER — WEB ENCOUNTER (OUTPATIENT)
Dept: URBAN - METROPOLITAN AREA CLINIC 35 | Facility: CLINIC | Age: 79
End: 2024-10-12

## 2025-03-12 ENCOUNTER — OFFICE VISIT (OUTPATIENT)
Dept: URBAN - METROPOLITAN AREA CLINIC 35 | Facility: CLINIC | Age: 80
End: 2025-03-12
Payer: MEDICARE

## 2025-03-12 VITALS
WEIGHT: 121 LBS | HEIGHT: 62 IN | HEART RATE: 87 BPM | DIASTOLIC BLOOD PRESSURE: 82 MMHG | SYSTOLIC BLOOD PRESSURE: 138 MMHG | BODY MASS INDEX: 22.26 KG/M2 | OXYGEN SATURATION: 98 %

## 2025-03-12 DIAGNOSIS — R12 HEARTBURN: ICD-10-CM

## 2025-03-12 DIAGNOSIS — K64.8 INTERNAL HEMORRHOIDS: ICD-10-CM

## 2025-03-12 DIAGNOSIS — K21.9 GASTROESOPHAGEAL REFLUX DISEASE WITHOUT ESOPHAGITIS: ICD-10-CM

## 2025-03-12 DIAGNOSIS — Z86.0101 HX OF ADENOMATOUS COLONIC POLYPS: ICD-10-CM

## 2025-03-12 DIAGNOSIS — K62.5 RECTAL BLEEDING: ICD-10-CM

## 2025-03-12 PROCEDURE — 99214 OFFICE O/P EST MOD 30 MIN: CPT | Performed by: STUDENT IN AN ORGANIZED HEALTH CARE EDUCATION/TRAINING PROGRAM

## 2025-03-12 RX ORDER — GABAPENTIN 300 MG/1
1 CAPSULE CAPSULE ORAL ONCE A DAY
Status: ON HOLD | COMMUNITY

## 2025-03-12 RX ORDER — FAMOTIDINE 40 MG/1
1 TABLET TABLET, FILM COATED ORAL
Qty: 180 | Refills: 3 | Status: ACTIVE | COMMUNITY
Start: 2024-03-12

## 2025-03-12 RX ORDER — MECLIZINE HYDROCHLORIDE 25 MG/1
1 TABLET AS NEEDED TABLET ORAL
Status: ON HOLD | COMMUNITY

## 2025-03-12 RX ORDER — ZOLPIDEM TARTRATE 10 MG/1
0.5 TABLET AT BEDTIME TABLET, FILM COATED ORAL ONCE A DAY
Refills: 0 | Status: ACTIVE | COMMUNITY

## 2025-03-12 RX ORDER — FAMOTIDINE 40 MG/1
1 TABLET TABLET, FILM COATED ORAL TWICE A DAY
Qty: 180 TABLET | Refills: 3
Start: 2021-09-01

## 2025-03-12 RX ORDER — MULTIVIT-MIN/IRON/FOLIC ACID/K 18-600-40
1 CAPSULE CAPSULE ORAL ONCE A DAY
Status: ACTIVE | COMMUNITY

## 2025-03-12 RX ORDER — NIACIN 500 MG
1 TABLET ORAL DAILY
Status: ON HOLD | COMMUNITY

## 2025-03-12 RX ORDER — OMEPRAZOLE/SODIUM BICARBONATE 20MG-1.1G
1 CAPSULE ON AN EMPTY STOMACH CAPSULE ORAL
Status: ON HOLD | COMMUNITY
Start: 2014-12-02

## 2025-03-12 RX ORDER — OMEPRAZOLE 40 MG/1
1 CAPSULE CAPSULE, DELAYED RELEASE ORAL ONCE A DAY
Qty: 90 CAPSULE | Refills: 3 | Status: ON HOLD | COMMUNITY
Start: 2021-02-03

## 2025-03-12 RX ORDER — TRAMADOL HYDROCHLORIDE 50 MG/1
1 TABLET AS NEEDED TABLET, FILM COATED ORAL ONCE A DAY
Status: ON HOLD | COMMUNITY

## 2025-03-12 RX ORDER — FAMOTIDINE 20 MG/1
1 TABLET AT BEDTIME TABLET, FILM COATED ORAL ONCE A DAY
Qty: 90 | Refills: 3 | Status: ON HOLD | COMMUNITY
Start: 2021-09-01

## 2025-03-12 RX ORDER — ASPIRIN 81 MG/1
1 TABLET TABLET, COATED ORAL ONCE A DAY
Status: ACTIVE | COMMUNITY

## 2025-03-12 RX ORDER — ATORVASTATIN CALCIUM 20 MG/1
1 TABLET TABLET, FILM COATED ORAL ONCE A DAY
Refills: 0 | Status: ACTIVE | COMMUNITY

## 2025-03-12 RX ORDER — UBIDECARENONE/VIT E ACET 100MG-5
1 TABLET CAPSULE ORAL ONCE A DAY
Status: ACTIVE | COMMUNITY

## 2025-03-12 RX ORDER — CALCIUM CARBONATE 500 MG/1
1 TABLET TABLET ORAL THREE TIMES A DAY
Status: ACTIVE | COMMUNITY

## 2025-03-12 RX ORDER — CARBOXYMETHYLCELLULOSE SODIUM 10 MG/ML
1 CAPSULES GEL OPHTHALMIC ONCE A DAY
Qty: 30 CAPSULE | Status: ACTIVE | COMMUNITY

## 2025-03-12 RX ORDER — DOCUSATE SODIUM 100 MG/1
2 CAPSULES CAPSULE ORAL ONCE A DAY
Status: ACTIVE | COMMUNITY

## 2025-03-12 NOTE — HPI-TODAY'S VISIT:
80-year-old female presents for follow-up for GERD, symptomatic hemorrhoids.  She reports since last visit still has intermittent bleeding, and pruritus with hemorrhoids.  Prolapse but does ultimately spontaneously reduces.  She denies any pain.  She takes courses of hydrocortisone suppository which helps temporize symptoms.  Symptoms though eventually recur.  She prefers to hold off on colonoscopy at this time until she is due for surveillance next year.She is agreeable to proceed with clinic based banding with 1 hemorrhoid column at a time.  In terms of heartburn are well-controlled on famotidine 40 mg twice daily.  Infrequent breakthrough.  Labs Completed: 12/11/2024 CBC: WBC 8.4 wnl, HGB 14.4 wnl, MCV 93.3 wnl, Platelets 240 wnl,  CMP: Bilirubin 1.2 wnl, ALK Phos 54 wnl, AST 21 wnl, ALT 17 wnl, Creatinine 0.78 wnl.   Last Visit(07/24/2024) 79 year old female presents today for GERD and hemrrohoids follow up. Doing well on famotidine, no significant breakthrough heartburn or coughing/choking sensation. For hemorrhoids,has tried suppositories PRN, partial relief but not complete prolonged relief symptoms. Intermittent rectal bleeding still. Tried metamucil felt made her bloat. Generally 1 BM per day, intermittent incomplete evacuation.   Of last visit 3.12.24 79 year old female patient presents today for a routine follow-up visit. Previously following with Dr. Good for years. Patient following up primarily today to discuss reflux history and coming off PPI. She has reflux, can go for some time without symptoms and then can recurs. Comes in periods of time. She is hestiant to stay on PPI long term, she reports recent osteopenia and hesitant w/ potential  fracture association. She is also concerned of dementia association. She is taking famotidine nightly. Currently, helps relatively well. And wants to see if can take twice day.  Intermittent choking/difficulty breathing sensation otherwise no food sticking after swallowing, symptoms chronic,  multiple prior work up including MBS w/o significant findings or aspiration.  She reports hemorrhoidal symptoms, with intermittent rectal bleeding comes and goes. Does not want hemorrhoid surgery. Reports relatively excess gas, takes beano, gasx. Has list high gas producing foods andavoid. She does endorse eats lots sweets. She has BM at least once in morning, sometimes twice. Denies subjective constipation.    Last Colonoscopy 11/18/2021 One 4 mm polyp in the descending colon, removed with a cold snare. Resected and retrieved.Internal prolapsed hemorrhoids.  Pathology report of 11/18/2021 1. Colon, Descending, Polyp - Sessile serrated polyp/adenoma  Last EGD 12/01/2017 W/Bravo - Normal esophagus - Z-line regular, 39 cm from the incisors - Non-bleeding erosive gastropathy - Normal examined duodenum - Multiple biopsies were obtained in the distal esophagus - Multiple biopsies were obtained in the gastric antrum - The Bravo capsule was deployed  Pathology report of 12/01/2017 A. Stomach, Antrum Biopsy - Antral type gastric mucosa with regenerative epithelial changes suggestive of chemical injury/healing ulcer site - Giemsa stain is negative for H. Pylori microorganisms - PAS/Alcian blue stain is negative for intestinal metaplasia B. Distal Esophagus Biopsy - Eosinophilic esophagitis - No dysplasia identified - PAS/Alcian blue stain is negative for intestinal metaplasia - PAS portion of PAS/Alcian blue stain is negative for fungal prganisms  Last visit 03/08/2023 Patient presents today for a follow-up office visit from 09/07/2022. She is taking Omeprazole 40 mg daily and Famotidine 40 mg, at bedtime. Patient denies the continued use of Zegerid since 4-5 month ago. She admits the continued use of TUMS as needed. She admits improvement in the heartburn. She admits to continued episodes of coughing intermittently. Cough went away and then returned after a cold. Patient denies any dysphagia episodes, nausea or vomiting. Patient is taking Colace, 2 tablets daily. She admits intermittent rectal bleeding at bowel movement within the toilet water. No anemia noted on any blood work per patient. Patient denies any episodes of constipation or diarrhea at this time. Patient denies needing any refills at this time.

## 2025-04-08 PROBLEM — 721705006: Status: ACTIVE | Noted: 2025-04-08

## 2025-04-16 ENCOUNTER — OFFICE VISIT (OUTPATIENT)
Dept: URBAN - METROPOLITAN AREA CLINIC 35 | Facility: CLINIC | Age: 80
End: 2025-04-16
Payer: MEDICARE

## 2025-04-16 DIAGNOSIS — K64.2 GRADE III HEMORRHOIDS: ICD-10-CM

## 2025-04-16 PROCEDURE — 46221 LIGATION OF HEMORRHOID(S): CPT | Performed by: STUDENT IN AN ORGANIZED HEALTH CARE EDUCATION/TRAINING PROGRAM

## 2025-04-16 RX ORDER — FAMOTIDINE 40 MG/1
1 TABLET TABLET, FILM COATED ORAL TWICE A DAY
Qty: 180 TABLET | Refills: 3 | Status: ACTIVE | COMMUNITY
Start: 2021-09-01

## 2025-04-16 RX ORDER — CARBOXYMETHYLCELLULOSE SODIUM 10 MG/ML
1 CAPSULES GEL OPHTHALMIC ONCE A DAY
Qty: 30 CAPSULE | Status: ACTIVE | COMMUNITY

## 2025-04-16 RX ORDER — FAMOTIDINE 40 MG/1
1 TABLET TABLET, FILM COATED ORAL
Qty: 180 | Refills: 3 | Status: ACTIVE | COMMUNITY
Start: 2024-03-12

## 2025-04-16 RX ORDER — ATORVASTATIN CALCIUM 20 MG/1
1 TABLET TABLET, FILM COATED ORAL ONCE A DAY
Refills: 0 | Status: ACTIVE | COMMUNITY

## 2025-04-16 RX ORDER — GABAPENTIN 300 MG/1
1 CAPSULE CAPSULE ORAL ONCE A DAY
Status: ON HOLD | COMMUNITY

## 2025-04-16 RX ORDER — TRAMADOL HYDROCHLORIDE 50 MG/1
1 TABLET AS NEEDED TABLET, FILM COATED ORAL ONCE A DAY
Status: ON HOLD | COMMUNITY

## 2025-04-16 RX ORDER — DOCUSATE SODIUM 100 MG/1
2 CAPSULES CAPSULE ORAL ONCE A DAY
Status: ACTIVE | COMMUNITY

## 2025-04-16 RX ORDER — OMEPRAZOLE/SODIUM BICARBONATE 20MG-1.1G
1 CAPSULE ON AN EMPTY STOMACH CAPSULE ORAL
Status: ON HOLD | COMMUNITY
Start: 2014-12-02

## 2025-04-16 RX ORDER — UBIDECARENONE/VIT E ACET 100MG-5
1 TABLET CAPSULE ORAL ONCE A DAY
Status: ACTIVE | COMMUNITY

## 2025-04-16 RX ORDER — MECLIZINE HYDROCHLORIDE 25 MG/1
1 TABLET AS NEEDED TABLET ORAL
Status: ON HOLD | COMMUNITY

## 2025-04-16 RX ORDER — ASPIRIN 81 MG/1
1 TABLET TABLET, COATED ORAL ONCE A DAY
Status: ACTIVE | COMMUNITY

## 2025-04-16 RX ORDER — OMEPRAZOLE 40 MG/1
1 CAPSULE CAPSULE, DELAYED RELEASE ORAL ONCE A DAY
Qty: 90 CAPSULE | Refills: 3 | Status: ON HOLD | COMMUNITY
Start: 2021-02-03

## 2025-04-16 RX ORDER — CALCIUM CARBONATE 500 MG/1
1 TABLET TABLET ORAL THREE TIMES A DAY
Status: ACTIVE | COMMUNITY

## 2025-04-16 RX ORDER — NIACIN 500 MG
1 TABLET ORAL DAILY
Status: ON HOLD | COMMUNITY

## 2025-04-16 RX ORDER — MULTIVIT-MIN/IRON/FOLIC ACID/K 18-600-40
1 CAPSULE CAPSULE ORAL ONCE A DAY
Status: ACTIVE | COMMUNITY

## 2025-04-16 RX ORDER — ZOLPIDEM TARTRATE 10 MG/1
0.5 TABLET AT BEDTIME TABLET, FILM COATED ORAL ONCE A DAY
Refills: 0 | Status: ACTIVE | COMMUNITY

## 2025-04-16 NOTE — HPI-TODAY'S VISIT:
80-year-old female presents for banding for symptomatic hemorrhoids.  She reports had significant bleeding from hemorrhoids today.  Last time she had bleeding was couple weeks ago.  Reports generally more mild current episode.  Reports chronic discomfort, occasional pruritus.  No constipation.  Bowel movements are regular and formed.

## 2025-04-30 ENCOUNTER — OFFICE VISIT (OUTPATIENT)
Dept: URBAN - METROPOLITAN AREA CLINIC 35 | Facility: CLINIC | Age: 80
End: 2025-04-30

## 2025-04-30 RX ORDER — ZOLPIDEM TARTRATE 10 MG/1
0.5 TABLET AT BEDTIME TABLET, FILM COATED ORAL ONCE A DAY
Refills: 0 | Status: ACTIVE | COMMUNITY

## 2025-04-30 RX ORDER — CALCIUM CARBONATE 500 MG/1
1 TABLET TABLET ORAL THREE TIMES A DAY
Status: ACTIVE | COMMUNITY

## 2025-04-30 RX ORDER — TRAMADOL HYDROCHLORIDE 50 MG/1
1 TABLET AS NEEDED TABLET, FILM COATED ORAL ONCE A DAY
Status: ON HOLD | COMMUNITY

## 2025-04-30 RX ORDER — GABAPENTIN 300 MG/1
1 CAPSULE CAPSULE ORAL ONCE A DAY
Status: ON HOLD | COMMUNITY

## 2025-04-30 RX ORDER — CARBOXYMETHYLCELLULOSE SODIUM 10 MG/ML
1 CAPSULES GEL OPHTHALMIC ONCE A DAY
Qty: 30 CAPSULE | Status: ACTIVE | COMMUNITY

## 2025-04-30 RX ORDER — MECLIZINE HYDROCHLORIDE 25 MG/1
1 TABLET AS NEEDED TABLET ORAL
Status: ON HOLD | COMMUNITY

## 2025-04-30 RX ORDER — OMEPRAZOLE 40 MG/1
1 CAPSULE CAPSULE, DELAYED RELEASE ORAL ONCE A DAY
Qty: 90 CAPSULE | Refills: 3 | Status: ON HOLD | COMMUNITY
Start: 2021-02-03

## 2025-04-30 RX ORDER — ASPIRIN 81 MG/1
1 TABLET TABLET, COATED ORAL ONCE A DAY
Status: ACTIVE | COMMUNITY

## 2025-04-30 RX ORDER — FAMOTIDINE 40 MG/1
1 TABLET TABLET, FILM COATED ORAL TWICE A DAY
Qty: 180 TABLET | Refills: 3 | Status: ACTIVE | COMMUNITY
Start: 2021-09-01

## 2025-04-30 RX ORDER — ATORVASTATIN CALCIUM 20 MG/1
1 TABLET TABLET, FILM COATED ORAL ONCE A DAY
Refills: 0 | Status: ACTIVE | COMMUNITY

## 2025-04-30 RX ORDER — NIACIN 500 MG
1 TABLET ORAL DAILY
Status: ON HOLD | COMMUNITY

## 2025-04-30 RX ORDER — DOCUSATE SODIUM 100 MG/1
2 CAPSULES CAPSULE ORAL ONCE A DAY
Status: ACTIVE | COMMUNITY

## 2025-04-30 RX ORDER — OMEPRAZOLE/SODIUM BICARBONATE 20MG-1.1G
1 CAPSULE ON AN EMPTY STOMACH CAPSULE ORAL
Status: ON HOLD | COMMUNITY
Start: 2014-12-02

## 2025-04-30 RX ORDER — UBIDECARENONE/VIT E ACET 100MG-5
1 TABLET CAPSULE ORAL ONCE A DAY
Status: ACTIVE | COMMUNITY

## 2025-04-30 RX ORDER — FAMOTIDINE 40 MG/1
1 TABLET TABLET, FILM COATED ORAL
Qty: 180 | Refills: 3 | Status: ACTIVE | COMMUNITY
Start: 2024-03-12

## 2025-04-30 RX ORDER — MULTIVIT-MIN/IRON/FOLIC ACID/K 18-600-40
1 CAPSULE CAPSULE ORAL ONCE A DAY
Status: ACTIVE | COMMUNITY

## 2025-04-30 NOTE — HPI-TODAY'S VISIT:
Blood x 1 episode after last banding, has not recurred After banding, did well Wed after banding had small blood then stoped Fiber makes bloated No significant constipation, using colace Rec use benafiber Exam grade III hemorrhoids RA prolapsing Right anterior treated today

## 2025-05-14 ENCOUNTER — OFFICE VISIT (OUTPATIENT)
Dept: URBAN - METROPOLITAN AREA CLINIC 35 | Facility: CLINIC | Age: 80
End: 2025-05-14
Payer: MEDICARE

## 2025-05-14 DIAGNOSIS — K64.2 GRADE III HEMORRHOIDS: ICD-10-CM

## 2025-05-14 PROCEDURE — 46221 LIGATION OF HEMORRHOID(S): CPT | Performed by: STUDENT IN AN ORGANIZED HEALTH CARE EDUCATION/TRAINING PROGRAM

## 2025-05-14 RX ORDER — CARBOXYMETHYLCELLULOSE SODIUM 10 MG/ML
1 CAPSULES GEL OPHTHALMIC ONCE A DAY
Qty: 30 CAPSULE | Status: ACTIVE | COMMUNITY

## 2025-05-14 RX ORDER — ATORVASTATIN CALCIUM 20 MG/1
1 TABLET TABLET, FILM COATED ORAL ONCE A DAY
Refills: 0 | Status: ACTIVE | COMMUNITY

## 2025-05-14 RX ORDER — TRAMADOL HYDROCHLORIDE 50 MG/1
1 TABLET AS NEEDED TABLET, FILM COATED ORAL ONCE A DAY
Status: ON HOLD | COMMUNITY

## 2025-05-14 RX ORDER — FAMOTIDINE 40 MG/1
1 TABLET TABLET, FILM COATED ORAL TWICE A DAY
Qty: 180 TABLET | Refills: 3 | Status: ACTIVE | COMMUNITY
Start: 2021-09-01

## 2025-05-14 RX ORDER — OMEPRAZOLE/SODIUM BICARBONATE 20MG-1.1G
1 CAPSULE ON AN EMPTY STOMACH CAPSULE ORAL
Status: ON HOLD | COMMUNITY
Start: 2014-12-02

## 2025-05-14 RX ORDER — UBIDECARENONE/VIT E ACET 100MG-5
1 TABLET CAPSULE ORAL ONCE A DAY
Status: ACTIVE | COMMUNITY

## 2025-05-14 RX ORDER — ASPIRIN 81 MG/1
1 TABLET TABLET, COATED ORAL ONCE A DAY
Status: ACTIVE | COMMUNITY

## 2025-05-14 RX ORDER — DOCUSATE SODIUM 100 MG/1
2 CAPSULES CAPSULE ORAL ONCE A DAY
Status: ACTIVE | COMMUNITY

## 2025-05-14 RX ORDER — ZOLPIDEM TARTRATE 10 MG/1
0.5 TABLET AT BEDTIME TABLET, FILM COATED ORAL ONCE A DAY
Refills: 0 | Status: ACTIVE | COMMUNITY

## 2025-05-14 RX ORDER — MULTIVIT-MIN/IRON/FOLIC ACID/K 18-600-40
1 CAPSULE CAPSULE ORAL ONCE A DAY
Status: ACTIVE | COMMUNITY

## 2025-05-14 RX ORDER — OMEPRAZOLE 40 MG/1
1 CAPSULE CAPSULE, DELAYED RELEASE ORAL ONCE A DAY
Qty: 90 CAPSULE | Refills: 3 | Status: ON HOLD | COMMUNITY
Start: 2021-02-03

## 2025-05-14 RX ORDER — FAMOTIDINE 40 MG/1
1 TABLET TABLET, FILM COATED ORAL
Qty: 180 | Refills: 3 | Status: ACTIVE | COMMUNITY
Start: 2024-03-12

## 2025-05-14 RX ORDER — NIACIN 500 MG
1 TABLET ORAL DAILY
Status: ON HOLD | COMMUNITY

## 2025-05-14 RX ORDER — MECLIZINE HYDROCHLORIDE 25 MG/1
1 TABLET AS NEEDED TABLET ORAL
Status: ON HOLD | COMMUNITY

## 2025-05-14 RX ORDER — CALCIUM CARBONATE 500 MG/1
1 TABLET TABLET ORAL THREE TIMES A DAY
Status: ACTIVE | COMMUNITY

## 2025-05-14 RX ORDER — GABAPENTIN 300 MG/1
1 CAPSULE CAPSULE ORAL ONCE A DAY
Status: ON HOLD | COMMUNITY

## 2025-05-14 NOTE — HPI-TODAY'S VISIT:
80-year-old female presents for hemorrhoid banding. No acute issues post second banding. Reports had episode bleeding last weekend that stopped, mild prolapse sensation.

## 2025-07-16 ENCOUNTER — OFFICE VISIT (OUTPATIENT)
Dept: URBAN - METROPOLITAN AREA CLINIC 35 | Facility: CLINIC | Age: 80
End: 2025-07-16

## 2025-07-30 ENCOUNTER — OFFICE VISIT (OUTPATIENT)
Dept: URBAN - METROPOLITAN AREA CLINIC 35 | Facility: CLINIC | Age: 80
End: 2025-07-30
Payer: MEDICARE

## 2025-07-30 DIAGNOSIS — K64.8 INTERNAL HEMORRHOIDS: ICD-10-CM

## 2025-07-30 DIAGNOSIS — K62.5 RECTAL BLEEDING: ICD-10-CM

## 2025-07-30 DIAGNOSIS — K21.9 GASTROESOPHAGEAL REFLUX DISEASE WITHOUT ESOPHAGITIS: ICD-10-CM

## 2025-07-30 DIAGNOSIS — Z86.0101 HX OF ADENOMATOUS COLONIC POLYPS: ICD-10-CM

## 2025-07-30 PROCEDURE — 99213 OFFICE O/P EST LOW 20 MIN: CPT | Performed by: STUDENT IN AN ORGANIZED HEALTH CARE EDUCATION/TRAINING PROGRAM

## 2025-07-30 RX ORDER — DOCUSATE SODIUM 100 MG/1
2 CAPSULES CAPSULE ORAL ONCE A DAY
Status: ACTIVE | COMMUNITY

## 2025-07-30 RX ORDER — OMEPRAZOLE/SODIUM BICARBONATE 20MG-1.1G
1 CAPSULE ON AN EMPTY STOMACH CAPSULE ORAL
Status: ON HOLD | COMMUNITY
Start: 2014-12-02

## 2025-07-30 RX ORDER — ASPIRIN 81 MG/1
1 TABLET TABLET, COATED ORAL ONCE A DAY
Status: ACTIVE | COMMUNITY

## 2025-07-30 RX ORDER — FAMOTIDINE 40 MG/1
1 TABLET TABLET, FILM COATED ORAL TWICE A DAY
Qty: 180 TABLET | Refills: 3 | Status: ACTIVE | COMMUNITY
Start: 2021-09-01

## 2025-07-30 RX ORDER — CARBOXYMETHYLCELLULOSE SODIUM 10 MG/ML
1 CAPSULES GEL OPHTHALMIC ONCE A DAY
Qty: 30 CAPSULE | Status: ACTIVE | COMMUNITY

## 2025-07-30 RX ORDER — GABAPENTIN 300 MG/1
1 CAPSULE CAPSULE ORAL ONCE A DAY
Status: ON HOLD | COMMUNITY

## 2025-07-30 RX ORDER — NIACIN 500 MG
1 TABLET ORAL DAILY
Status: ON HOLD | COMMUNITY

## 2025-07-30 RX ORDER — MULTIVIT-MIN/IRON/FOLIC ACID/K 18-600-40
1 CAPSULE CAPSULE ORAL ONCE A DAY
Status: ACTIVE | COMMUNITY

## 2025-07-30 RX ORDER — OMEPRAZOLE 40 MG/1
1 CAPSULE CAPSULE, DELAYED RELEASE ORAL ONCE A DAY
Qty: 90 CAPSULE | Refills: 3 | Status: ON HOLD | COMMUNITY
Start: 2021-02-03

## 2025-07-30 RX ORDER — MECLIZINE HYDROCHLORIDE 25 MG/1
1 TABLET AS NEEDED TABLET ORAL
Status: ON HOLD | COMMUNITY

## 2025-07-30 RX ORDER — FAMOTIDINE 40 MG/1
1 TABLET TABLET, FILM COATED ORAL
Qty: 180 | Refills: 3 | Status: ACTIVE | COMMUNITY
Start: 2024-03-12

## 2025-07-30 RX ORDER — ATORVASTATIN CALCIUM 20 MG/1
1 TABLET TABLET, FILM COATED ORAL ONCE A DAY
Refills: 0 | Status: ACTIVE | COMMUNITY

## 2025-07-30 RX ORDER — CALCIUM CARBONATE 500 MG/1
1 TABLET TABLET ORAL THREE TIMES A DAY
Status: ACTIVE | COMMUNITY

## 2025-07-30 RX ORDER — ZOLPIDEM TARTRATE 10 MG/1
0.5 TABLET AT BEDTIME TABLET, FILM COATED ORAL ONCE A DAY
Refills: 0 | Status: ACTIVE | COMMUNITY

## 2025-07-30 RX ORDER — UBIDECARENONE/VIT E ACET 100MG-5
1 TABLET CAPSULE ORAL ONCE A DAY
Status: ACTIVE | COMMUNITY

## 2025-07-30 RX ORDER — TRAMADOL HYDROCHLORIDE 50 MG/1
1 TABLET AS NEEDED TABLET, FILM COATED ORAL ONCE A DAY
Status: ON HOLD | COMMUNITY

## 2025-07-30 RX ORDER — HYDROCORTISONE ACETATE 25 MG/1
1 SUPPOSITORY SUPPOSITORY RECTAL ONCE A DAY
Qty: 14 SUPPOSITORY | Refills: 0 | OUTPATIENT
Start: 2025-07-30 | End: 2025-08-13

## 2025-07-30 NOTE — HPI-TODAY'S VISIT:
80-year-old female presents for followup for internal hemorrhoids, rectal bleeding. Since last banding reports hemorrhoid symptoms improved, but still has intermittent blood noted, last 2 weeks ago. Mild protrusion sensation. No discomfort or pain.   Last Visit(05/14/2025) 80-year-old female presents for hemorrhoid banding. No acute issues post second banding. Reports had episode bleeding last weekend that stopped, mild prolapse sensation.